# Patient Record
Sex: FEMALE | Race: WHITE
[De-identification: names, ages, dates, MRNs, and addresses within clinical notes are randomized per-mention and may not be internally consistent; named-entity substitution may affect disease eponyms.]

---

## 2017-04-05 ENCOUNTER — HOSPITAL ENCOUNTER (EMERGENCY)
Dept: HOSPITAL 83 - ED | Age: 27
Discharge: HOME | End: 2017-04-05
Payer: COMMERCIAL

## 2017-04-05 VITALS — HEIGHT: 55 IN | WEIGHT: 160 LBS
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
## 2017-04-05 DIAGNOSIS — M79.602: Primary | ICD-10-CM

## 2017-04-05 DIAGNOSIS — Z88.0: ICD-10-CM
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
## 2017-04-05 DIAGNOSIS — F17.200: ICD-10-CM

## 2017-04-05 DIAGNOSIS — Z88.1: ICD-10-CM
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
## 2017-04-23 ENCOUNTER — HOSPITAL ENCOUNTER (EMERGENCY)
Dept: HOSPITAL 83 - ED | Age: 27
Discharge: HOME | End: 2017-04-23
Payer: COMMERCIAL

## 2017-04-23 VITALS — BODY MASS INDEX: 27.23 KG/M2 | HEIGHT: 61.97 IN | WEIGHT: 148 LBS

## 2017-04-23 DIAGNOSIS — Z88.0: ICD-10-CM
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
## 2017-04-23 DIAGNOSIS — F17.200: ICD-10-CM

## 2017-04-23 DIAGNOSIS — S00.83XA: Primary | ICD-10-CM

## 2017-04-23 DIAGNOSIS — Z88.1: ICD-10-CM

## 2017-04-23 DIAGNOSIS — S60.221A: ICD-10-CM
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
## 2017-06-21 ENCOUNTER — HOSPITAL ENCOUNTER (EMERGENCY)
Dept: HOSPITAL 83 - ED | Age: 27
Discharge: HOME | End: 2017-06-21
Payer: COMMERCIAL

## 2017-06-21 VITALS — WEIGHT: 149 LBS | BODY MASS INDEX: 27.42 KG/M2 | HEIGHT: 61.97 IN

## 2017-06-21 DIAGNOSIS — T23.001A: Primary | ICD-10-CM

## 2017-06-21 DIAGNOSIS — Z88.0: ICD-10-CM
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
## 2017-06-21 DIAGNOSIS — Z88.1: ICD-10-CM
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
## 2017-06-21 DIAGNOSIS — F17.200: ICD-10-CM

## 2017-07-21 ENCOUNTER — HOSPITAL ENCOUNTER (EMERGENCY)
Dept: HOSPITAL 83 - ED | Age: 27
Discharge: HOME | End: 2017-07-21
Payer: COMMERCIAL

## 2017-07-21 VITALS — WEIGHT: 145 LBS | BODY MASS INDEX: 26.68 KG/M2 | HEIGHT: 61.97 IN

## 2017-07-21 DIAGNOSIS — Z88.0: ICD-10-CM

## 2017-07-21 DIAGNOSIS — K52.9: Primary | ICD-10-CM

## 2017-07-21 DIAGNOSIS — F17.200: ICD-10-CM

## 2017-07-21 DIAGNOSIS — Z88.1: ICD-10-CM

## 2017-07-21 LAB
ALBUMIN SERPL-MCNC: (no result) G/DL
ALBUMIN SERPL-MCNC: 4 GM/DL (ref 3.1–4.5)
ALP SERPL-CCNC: 47 U/L (ref 45–117)
ALT SERPL W P-5'-P-CCNC: 11 U/L (ref 12–78)
APPEARANCE UR: (no result)
AST SERPL-CCNC: 9 IU/L (ref 3–35)
B-HCG SERPL-ACNC: NEGATIVE
BACTERIA #/AREA URNS HPF: (no result) /[HPF]
BASOPHILS # BLD AUTO: 0 10*3/UL (ref 0–0.1)
BASOPHILS NFR BLD AUTO: 0.2 % (ref 0–1)
BILIRUB UR QL STRIP: NEGATIVE
BUN SERPL-MCNC: 9 MG/DL (ref 7–24)
CHLORIDE SERPL-SCNC: 110 MMOL/L (ref 98–107)
CO2 SERPL-SCNC: 21 MMOL/L (ref 21–32)
COLOR UR: YELLOW
CRP SERPL-MCNC: < 0.29 MG/DL (ref 0–0.3)
EOSINOPHIL # BLD AUTO: 0 10*3/UL (ref 0–0.4)
EOSINOPHIL # BLD AUTO: 0.3 % (ref 1–4)
EPI CELLS #/AREA URNS HPF: (no result) /[HPF]
ERYTHROCYTE [DISTWIDTH] IN BLOOD BY AUTOMATED COUNT: 12.8 % (ref 0–14.5)
GLUCOSE SERPL-MCNC: 92 MG/DL (ref 65–99)
GLUCOSE UR QL: NEGATIVE
HCT VFR BLD AUTO: 39 % (ref 37–47)
HGB BLD-MCNC: 13.5 G/DL (ref 12–16)
HGB UR QL STRIP: NEGATIVE
IG #: 0.1 10*3/UL (ref 0–0.1)
KETONES UR QL STRIP: (no result)
LEUKOCYTE ESTERASE UR QL STRIP: (no result)
LYMPHOCYTES # BLD AUTO: 2.4 10*3/UL (ref 1.3–4.4)
LYMPHOCYTES NFR BLD AUTO: 15.1 % (ref 27–41)
MCH RBC QN AUTO: 29.7 PG (ref 27–31)
MCHC RBC AUTO-ENTMCNC: 34.6 G/DL (ref 33–37)
MCV RBC AUTO: 85.7 FL (ref 81–99)
MONOCYTES # BLD AUTO: 0.8 10*3/UL (ref 0.1–1)
MONOCYTES NFR BLD MANUAL: 5 % (ref 3–9)
NEUT #: 12.5 10*3/UL (ref 2.3–7.9)
NEUT %: 79 % (ref 47–73)
NITRITE UR QL STRIP: NEGATIVE
NRBC BLD QL AUTO: 0 % (ref 0–0)
PH UR STRIP: >= 9 [PH] (ref 5–9)
PLATELET # BLD AUTO: 249 10*3/UL (ref 130–400)
PMV BLD AUTO: 10.2 FL (ref 9.6–12.3)
POTASSIUM SERPL-SCNC: 3.7 MMOL/L (ref 3.5–5.1)
PROT SERPL-MCNC: 7.2 GM/DL (ref 6.4–8.2)
RBC # BLD AUTO: 4.55 10*6/UL (ref 4.1–5.1)
RBC #/AREA URNS HPF: (no result) RBC/HPF (ref 0–2)
SODIUM SERPL-SCNC: 140 MMOL/L (ref 136–145)
SP GR UR: 1.01 (ref 1–1.03)
URINE REFLEX COMMENT: YES
UROBILINOGEN UR STRIP-MCNC: 1 E.U./DL (ref 0.2–1)
WBC NRBC COR # BLD AUTO: 15.8 10*3/UL (ref 4.8–10.8)

## 2018-03-07 ENCOUNTER — HOSPITAL ENCOUNTER (OUTPATIENT)
Dept: HOSPITAL 83 - LAB | Age: 28
Discharge: HOME | End: 2018-03-07
Attending: OBSTETRICS & GYNECOLOGY
Payer: COMMERCIAL

## 2018-03-07 DIAGNOSIS — Z3A.11: ICD-10-CM

## 2018-03-07 DIAGNOSIS — Z34.80: Primary | ICD-10-CM

## 2018-03-08 LAB — HEPATITIS C VIRUS ANTIBODY: <0.1 S/CO (ref 0–0.9)

## 2018-04-05 ENCOUNTER — ROUTINE PRENATAL (OUTPATIENT)
Dept: OBGYN CLINIC | Age: 28
End: 2018-04-05
Payer: MEDICAID

## 2018-04-05 VITALS
DIASTOLIC BLOOD PRESSURE: 77 MMHG | BODY MASS INDEX: 30 KG/M2 | SYSTOLIC BLOOD PRESSURE: 117 MMHG | WEIGHT: 164 LBS | HEART RATE: 96 BPM

## 2018-04-05 DIAGNOSIS — F12.20 CANNABIS DEPENDENCE (HCC): ICD-10-CM

## 2018-04-05 DIAGNOSIS — O09.892 CYSTIC FIBROSIS CARRIER IN SECOND TRIMESTER, ANTEPARTUM: Primary | ICD-10-CM

## 2018-04-05 DIAGNOSIS — Z03.75 SUSPECTED SHORTENING OF CERVIX NOT FOUND: ICD-10-CM

## 2018-04-05 DIAGNOSIS — Z14.1 CYSTIC FIBROSIS CARRIER IN SECOND TRIMESTER, ANTEPARTUM: Primary | ICD-10-CM

## 2018-04-05 DIAGNOSIS — O99.322 DRUG DEPENDENCE AFFECTING PREGNANCY IN SECOND TRIMESTER: ICD-10-CM

## 2018-04-05 DIAGNOSIS — O99.332 TOBACCO SMOKING AFFECTING PREGNANCY IN SECOND TRIMESTER: ICD-10-CM

## 2018-04-05 DIAGNOSIS — Z3A.15 15 WEEKS GESTATION OF PREGNANCY: ICD-10-CM

## 2018-04-05 LAB
GLUCOSE URINE, POC: NORMAL
PROTEIN UA: POSITIVE

## 2018-04-05 PROCEDURE — G8419 CALC BMI OUT NRM PARAM NOF/U: HCPCS | Performed by: OBSTETRICS & GYNECOLOGY

## 2018-04-05 PROCEDURE — 76815 OB US LIMITED FETUS(S): CPT | Performed by: OBSTETRICS & GYNECOLOGY

## 2018-04-05 PROCEDURE — 81002 URINALYSIS NONAUTO W/O SCOPE: CPT | Performed by: OBSTETRICS & GYNECOLOGY

## 2018-04-05 PROCEDURE — 76817 TRANSVAGINAL US OBSTETRIC: CPT | Performed by: OBSTETRICS & GYNECOLOGY

## 2018-04-05 PROCEDURE — 99201 HC NEW PT, E/M LEVEL 1: CPT

## 2018-04-05 PROCEDURE — 99243 OFF/OP CNSLTJ NEW/EST LOW 30: CPT | Performed by: OBSTETRICS & GYNECOLOGY

## 2018-04-05 PROCEDURE — G8427 DOCREV CUR MEDS BY ELIG CLIN: HCPCS | Performed by: OBSTETRICS & GYNECOLOGY

## 2018-05-03 ENCOUNTER — ROUTINE PRENATAL (OUTPATIENT)
Dept: OBGYN CLINIC | Age: 28
End: 2018-05-03
Payer: MEDICAID

## 2018-05-03 VITALS
SYSTOLIC BLOOD PRESSURE: 123 MMHG | HEART RATE: 96 BPM | DIASTOLIC BLOOD PRESSURE: 81 MMHG | WEIGHT: 170 LBS | BODY MASS INDEX: 31.09 KG/M2

## 2018-05-03 DIAGNOSIS — F12.20 CANNABIS DEPENDENCE (HCC): ICD-10-CM

## 2018-05-03 DIAGNOSIS — O99.332 TOBACCO SMOKING AFFECTING PREGNANCY IN SECOND TRIMESTER: ICD-10-CM

## 2018-05-03 DIAGNOSIS — Z3A.19 19 WEEKS GESTATION OF PREGNANCY: ICD-10-CM

## 2018-05-03 DIAGNOSIS — Z36.89 ENCOUNTER FOR FETAL ANATOMIC SURVEY: ICD-10-CM

## 2018-05-03 DIAGNOSIS — O09.892 CYSTIC FIBROSIS CARRIER IN SECOND TRIMESTER, ANTEPARTUM: ICD-10-CM

## 2018-05-03 DIAGNOSIS — Z14.1 CYSTIC FIBROSIS CARRIER IN SECOND TRIMESTER, ANTEPARTUM: ICD-10-CM

## 2018-05-03 DIAGNOSIS — O99.322 DRUG DEPENDENCE AFFECTING PREGNANCY IN SECOND TRIMESTER: Primary | ICD-10-CM

## 2018-05-03 PROCEDURE — 81002 URINALYSIS NONAUTO W/O SCOPE: CPT | Performed by: OBSTETRICS & GYNECOLOGY

## 2018-05-03 PROCEDURE — 4004F PT TOBACCO SCREEN RCVD TLK: CPT | Performed by: OBSTETRICS & GYNECOLOGY

## 2018-05-03 PROCEDURE — 76811 OB US DETAILED SNGL FETUS: CPT | Performed by: OBSTETRICS & GYNECOLOGY

## 2018-05-03 PROCEDURE — 99211 OFF/OP EST MAY X REQ PHY/QHP: CPT | Performed by: OBSTETRICS & GYNECOLOGY

## 2018-05-03 PROCEDURE — G8419 CALC BMI OUT NRM PARAM NOF/U: HCPCS | Performed by: OBSTETRICS & GYNECOLOGY

## 2018-05-03 PROCEDURE — 99214 OFFICE O/P EST MOD 30 MIN: CPT | Performed by: OBSTETRICS & GYNECOLOGY

## 2018-05-03 PROCEDURE — G8427 DOCREV CUR MEDS BY ELIG CLIN: HCPCS | Performed by: OBSTETRICS & GYNECOLOGY

## 2018-05-04 LAB
GLUCOSE URINE, POC: NEGATIVE
PROTEIN UA: NEGATIVE

## 2018-06-21 ENCOUNTER — HOSPITAL ENCOUNTER (EMERGENCY)
Dept: HOSPITAL 83 - ED | Age: 28
Discharge: HOME | End: 2018-06-21
Payer: COMMERCIAL

## 2018-06-21 VITALS — BODY MASS INDEX: 32.2 KG/M2 | WEIGHT: 175 LBS | HEIGHT: 61.97 IN

## 2018-06-21 DIAGNOSIS — Z88.0: ICD-10-CM

## 2018-06-21 DIAGNOSIS — Z3A.26: ICD-10-CM

## 2018-06-21 DIAGNOSIS — O26.892: Primary | ICD-10-CM

## 2018-06-21 DIAGNOSIS — K08.89: ICD-10-CM

## 2018-06-21 DIAGNOSIS — Z91.09: ICD-10-CM

## 2018-06-21 DIAGNOSIS — Z88.1: ICD-10-CM

## 2018-07-16 ENCOUNTER — HOSPITAL ENCOUNTER (OUTPATIENT)
Age: 28
Setting detail: OBSERVATION
Discharge: HOME | End: 2018-07-16
Attending: OBSTETRICS & GYNECOLOGY | Admitting: OBSTETRICS & GYNECOLOGY
Payer: MEDICAID

## 2018-07-16 VITALS
HEART RATE: 90 BPM | TEMPERATURE: 98.5 F | WEIGHT: 175 LBS | SYSTOLIC BLOOD PRESSURE: 115 MMHG | HEIGHT: 62 IN | RESPIRATION RATE: 14 BRPM | DIASTOLIC BLOOD PRESSURE: 55 MMHG | BODY MASS INDEX: 32.2 KG/M2

## 2018-07-16 PROBLEM — Z3A.29 29 WEEKS GESTATION OF PREGNANCY: Status: ACTIVE | Noted: 2018-07-16

## 2018-07-16 PROBLEM — Z3A.29 PREGNANCY WITH 29 COMPLETED WEEKS GESTATION: Status: ACTIVE | Noted: 2018-07-16

## 2018-07-16 LAB
ALBUMIN SERPL-MCNC: 3.2 G/DL (ref 3.5–5.2)
ALP BLD-CCNC: 77 U/L (ref 35–104)
ALT SERPL-CCNC: 6 U/L (ref 0–32)
ANION GAP SERPL CALCULATED.3IONS-SCNC: 17 MMOL/L (ref 7–16)
AST SERPL-CCNC: 12 U/L (ref 0–31)
BASOPHILS ABSOLUTE: 0.02 E9/L (ref 0–0.2)
BASOPHILS RELATIVE PERCENT: 0.1 % (ref 0–2)
BILIRUB SERPL-MCNC: 0.2 MG/DL (ref 0–1.2)
BUN BLDV-MCNC: 7 MG/DL (ref 6–20)
CALCIUM SERPL-MCNC: 8.8 MG/DL (ref 8.6–10.2)
CHLORIDE BLD-SCNC: 103 MMOL/L (ref 98–107)
CO2: 16 MMOL/L (ref 22–29)
CREAT SERPL-MCNC: 0.4 MG/DL (ref 0.5–1)
EOSINOPHILS ABSOLUTE: 0.06 E9/L (ref 0.05–0.5)
EOSINOPHILS RELATIVE PERCENT: 0.4 % (ref 0–6)
GFR AFRICAN AMERICAN: >60
GFR NON-AFRICAN AMERICAN: >60 ML/MIN/1.73
GLUCOSE BLD-MCNC: 81 MG/DL (ref 74–109)
HCT VFR BLD CALC: 32.6 % (ref 34–48)
HEMOGLOBIN: 11.1 G/DL (ref 11.5–15.5)
IMMATURE GRANULOCYTES #: 0.07 E9/L
IMMATURE GRANULOCYTES %: 0.5 % (ref 0–5)
LYMPHOCYTES ABSOLUTE: 2.99 E9/L (ref 1.5–4)
LYMPHOCYTES RELATIVE PERCENT: 19.6 % (ref 20–42)
MCH RBC QN AUTO: 29.6 PG (ref 26–35)
MCHC RBC AUTO-ENTMCNC: 34 % (ref 32–34.5)
MCV RBC AUTO: 86.9 FL (ref 80–99.9)
MONOCYTES ABSOLUTE: 0.99 E9/L (ref 0.1–0.95)
MONOCYTES RELATIVE PERCENT: 6.5 % (ref 2–12)
NEUTROPHILS ABSOLUTE: 11.16 E9/L (ref 1.8–7.3)
NEUTROPHILS RELATIVE PERCENT: 72.9 % (ref 43–80)
PDW BLD-RTO: 12.8 FL (ref 11.5–15)
PLATELET # BLD: 275 E9/L (ref 130–450)
PMV BLD AUTO: 10.5 FL (ref 7–12)
POTASSIUM SERPL-SCNC: 4.4 MMOL/L (ref 3.5–5)
RBC # BLD: 3.75 E12/L (ref 3.5–5.5)
SODIUM BLD-SCNC: 136 MMOL/L (ref 132–146)
TOTAL PROTEIN: 6.4 G/DL (ref 6.4–8.3)
URIC ACID, SERUM: 4.4 MG/DL (ref 2.4–5.7)
WBC # BLD: 15.3 E9/L (ref 4.5–11.5)

## 2018-07-16 PROCEDURE — 2580000003 HC RX 258: Performed by: OBSTETRICS & GYNECOLOGY

## 2018-07-16 PROCEDURE — G0378 HOSPITAL OBSERVATION PER HR: HCPCS

## 2018-07-16 PROCEDURE — 96360 HYDRATION IV INFUSION INIT: CPT

## 2018-07-16 PROCEDURE — 76805 OB US >/= 14 WKS SNGL FETUS: CPT | Performed by: OBSTETRICS & GYNECOLOGY

## 2018-07-16 PROCEDURE — 99213 OFFICE O/P EST LOW 20 MIN: CPT | Performed by: OBSTETRICS & GYNECOLOGY

## 2018-07-16 PROCEDURE — 36415 COLL VENOUS BLD VENIPUNCTURE: CPT

## 2018-07-16 PROCEDURE — 99211 OFF/OP EST MAY X REQ PHY/QHP: CPT

## 2018-07-16 PROCEDURE — 99242 OFF/OP CONSLTJ NEW/EST SF 20: CPT | Performed by: OBSTETRICS & GYNECOLOGY

## 2018-07-16 PROCEDURE — 76819 FETAL BIOPHYS PROFIL W/O NST: CPT

## 2018-07-16 PROCEDURE — 76817 TRANSVAGINAL US OBSTETRIC: CPT | Performed by: OBSTETRICS & GYNECOLOGY

## 2018-07-16 PROCEDURE — 76817 TRANSVAGINAL US OBSTETRIC: CPT

## 2018-07-16 PROCEDURE — 96361 HYDRATE IV INFUSION ADD-ON: CPT

## 2018-07-16 PROCEDURE — 84550 ASSAY OF BLOOD/URIC ACID: CPT

## 2018-07-16 PROCEDURE — 76805 OB US >/= 14 WKS SNGL FETUS: CPT

## 2018-07-16 PROCEDURE — 85025 COMPLETE CBC W/AUTO DIFF WBC: CPT

## 2018-07-16 PROCEDURE — 76819 FETAL BIOPHYS PROFIL W/O NST: CPT | Performed by: OBSTETRICS & GYNECOLOGY

## 2018-07-16 PROCEDURE — 80053 COMPREHEN METABOLIC PANEL: CPT

## 2018-07-16 PROCEDURE — 6370000000 HC RX 637 (ALT 250 FOR IP): Performed by: OBSTETRICS & GYNECOLOGY

## 2018-07-16 RX ORDER — SODIUM CHLORIDE, SODIUM LACTATE, POTASSIUM CHLORIDE, CALCIUM CHLORIDE 600; 310; 30; 20 MG/100ML; MG/100ML; MG/100ML; MG/100ML
INJECTION, SOLUTION INTRAVENOUS CONTINUOUS
Status: DISCONTINUED | OUTPATIENT
Start: 2018-07-16 | End: 2018-07-16 | Stop reason: HOSPADM

## 2018-07-16 RX ORDER — MAGNESIUM HYDROXIDE/ALUMINUM HYDROXICE/SIMETHICONE 120; 1200; 1200 MG/30ML; MG/30ML; MG/30ML
30 SUSPENSION ORAL EVERY 6 HOURS PRN
Status: DISCONTINUED | OUTPATIENT
Start: 2018-07-16 | End: 2018-07-16 | Stop reason: HOSPADM

## 2018-07-16 RX ADMIN — ALUMINUM HYDROXIDE, MAGNESIUM HYDROXIDE, AND SIMETHICONE 30 ML: 200; 200; 20 SUSPENSION ORAL at 17:10

## 2018-07-16 RX ADMIN — SODIUM CHLORIDE, POTASSIUM CHLORIDE, SODIUM LACTATE AND CALCIUM CHLORIDE: 600; 310; 30; 20 INJECTION, SOLUTION INTRAVENOUS at 16:30

## 2018-07-16 NOTE — PROGRESS NOTES
Pt given discharge instructions, pt verbalizes understanding and pt left ambulatory with boyfriend and children

## 2018-07-16 NOTE — DISCHARGE SUMMARY
Patient was admitted for upper abd pain and vaginal Spotting. She now feels better after IV Fluid and Mylanta. She was evaluated by MFM and US is ok. CNP and Uric acid: normal. WBC; 15.3.  N: 72.9    Plan : Home, Follow-up with her [address] [place] [address]  Keep appointment with Dr Theodore Griffith

## 2018-07-16 NOTE — H&P
Department of Obstetrics and Gynecology  Physician Assistant Obstetrics History and Physical      HISTORY OF PRESENT ILLNESS:      The patient is a 34 y.o.  6 parity 2 at 33 weeks' 5 days' gestation presents to the antepartal floor c/o abdominal pain for 2 days. Her pain is intermittent; and, when present, is sharp. Last time she had abdominal pain was on her way to hospital.  Currently, she denies any abdominal pain. She had an episode of vomiting this morning, none since. Denies nausea, diarrhea, fever, chills, or constipation. Also, reports intermittent spotting for approximately 10 days which she states \"only occurs when bending over too much\" or \"with wiping\" after urination. Last episode of spotting was yesterday. She denies any urinary symptoms and states she has been drinking plenty of fluids. 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    Estimated Due Date:  2018  Contractions: No  Leaking of fluid: No  Bleeding:  No  Perceived fetal movement: good      PAST OB HISTORY:  OB History    Para Term  AB Living   6 2 2   3 2   SAB TAB Ectopic Molar Multiple Live Births   3         2      # Outcome Date GA Lbr Heath/2nd Weight Sex Delivery Anes PTL Lv   6 Current            5 SAB 2016           4 SAB 2016           3 2015           2 Term 13 40w0d  7 lb 14 oz (3.572 kg) F Vag-Spont  N SHAYLA   1 Term 11 42w0d  8 lb 7 oz (3.827 kg) M Vag-Spont  N SHAYLA              Pre-eclampsia:  No      :  No      D & C:  No      Cerclage:  No      LEEP:  No      Myomectomy:  No       Labor: No    Past Medical History:    Diagnosis Date    Anemia     Gastritis     GERD (gastroesophageal reflux disease)         Past Surgical History:    History reviewed. No pertinent surgical history. Social History:    Reports that she has been smoking.   She has been smoking about 0.25 packs per day. She has never used smokeless tobacco. She reports that she uses drugs, including Marijuana. She reports that she does not drink alcohol. Medications Prior to Admission:  Prescriptions Prior to Admission: Prenatal Vit-Fe Fumarate-FA (PRENATAL VITAMIN PO), Take by mouth    Allergies:  Penicillins; Tape Cynthea Belem tape]; and Azithromycin    Review of Systems:   Ears, nose, mouth, throat, and face: negative  Respiratory: negative  Cardiovascular: negative  Gastrointestinal: (+) abdominal pain, vomiting; otherwise, (-) nausea  Genitourinary:negative  Integument/breast: negative  Hematologic/lymphatic: negative  Musculoskeletal:negative  Neurological: negative  Behavioral/Psych: negative  Endocrine: negative  Allergic/Immunologic: negative  Pertinent (+) & (-)s addressed in HPI    PHYSICAL EXAM:  Temp 98.5 °F (36.9 °C) (Oral)   Resp 14   Ht 5' 2\" (1.575 m)   Wt 175 lb (79.4 kg)   BMI 32.01 kg/m²     General appearance: Comfortable  Lungs:  CTA bilaterally, good excursion  Heart:  Regular Rate & Rhythm, no murmur noted  Abdomen:  Soft, non-tender, gravid  Uterus: soft, nontender  Fetal heart rate:  Baseline Heart Rate 130 with moderate variability and accelerations that meet criteria for gestational age  Cervix: SSE: no blood or fluid in vagina. No blood on exam glove. DILATION: Closed  EFFACEMENT:   thick  Contraction frequency:  None  Membranes:  Intact  Extremities: (-) edema       ASSESSMENT: Patient was seen and examined by myself as well as by Dr. Alexus Ricketts. 33 yo  A3 IUP at 29 weeks' 5 days' gestation    Abdominal pain, improved  ?  Spotting    No blood         Plan: Orders per Dr. Alexus Ricketts:  EFM  Outpatient  CBC  CMP  Uric acid   U/a  IVF with [facility]  MFM consult      Electronically signed by Janiya Terry PA-C on 2018 at 2:47 PM

## 2018-07-16 NOTE — CONSULTS
edema, no calf muscle tenderness. Skin:     No rashes, no lesions. Patient Vitals for the past 24 hrs:   Temp Temp src Resp Height Weight   07/16/18 1433 98.5 °F (36.9 °C) Oral 14 5' 2\" (1.575 m) 175 lb (79.4 kg)       Body mass index is 32.01 kg/m². An ultrasound evaluation was done in our office today. Please refer to the enclosed copy of the ultrasound report for further information. Review of the fetal heart rate tracing showed a reactive and reassuring pattern    She said that she passed the glucose tolerance test that was done at the office of Dr. Pam Munguia. Result of test not available for review. IMPRESSION:  1. A 29w5d intrauterine pregnancy. 2. Obesity. 3. Cystic Fibrosis mutation carrier. 4. Father of the baby (Mr. Fernandez Kaminski) declined genetic testing for cystic fibrosis carrier status. 5. Patient and father of baby would not consider termination of pregnancy if her baby have cystic fibrosis. 6. Cigarette smoking during pregnancy. 7. Maternal obesity. 8. Marijuana abuse during pregnancy. RECOMMENDATIONS/PLAN:  I discussed with the patient the following points:    1. The benefits and limitations of ultrasound in prenatal diagnosis. Some defects might not always be seen by ultrasound. 2. No structural  anomalies are noted. Only genetic amniocentesis can rule out fetal chromosome anomalies. Normal ultrasound does not. 3. The size of her baby is appropriate for gestational age. 4. She is a carrier of the CF trait, and the father of her baby declined the CF screening test. On her last evaluation in our office she said that she would not  consider termination of pregnancy if the baby have cystic fibrosis and would refuse genetic amniocentesis if indicated to rule out the condition in her baby. 5. Obesity is assosiated with an increased risk of developing gestational diabetes, and disturbance in the growth of her baby, such as Large for dates and small for Dates.   She said that gestational diabetes was ruled out with a negative GTT done in the office of Dr Aurelia Suarez. Result of test not available for review. 6. The ill effects of cigarette smoking and substance abuse during pregnancy and its association with an increased risk of intrauterine growth restriction, placental abruption, and pregnancy loss. In addition to the increased risk of  morbidity and mortality there is an increased risk of sudden infant death syndrome in the households where people smoke. I recommend that she stops smoking, and abstains from illicit drug use. 7.  Some (not all) babies with the cystic fibrosis disease might have echogenic bowels on ultrasound. 8. The cervical length today is reassuring against the risk of a  delivery. 9. Fetal well-being was confirmed today. The amount of fluid around baby is normal.  The Biophysical profile score of 8/8 is reassuring, and the umbilical artery Doppler studies are normal.  10. She can be discharged home with following instructions. · She should monitor fetal well-being at home by counting movements after dinner. Her baby should  move 10 times in 2 hours; otherwise, she should call your office immediately. She is also to call, if she develops any headaches, blurred vision, abdominal pain, bleeding, or spotting, which are signs of preeclampsia. · She is to continue to follow follow-up in the office of Dr. Aurelia Suarez while arranging for transfer of prenatal care to a physician in the [facility] [place] area. She is planning on delivering at [facility] in [facility]. · I recommend a follow up ultrasound evaluation in  6 weeks, to check on fetal well being anatomy and growth    Thank you again, doctor, for allowing us to be of service to your patient. If I can be of further assistance, please do not hesitate to call.                              Sincerely,      Kelvin Dominguez M.D., [address] [facility]    Current encounter billing:  WILLIE INITL INPATIENT CONSULT NEW/ESTAB PT 40 MIN 33195 CPT®   US OB 14 Plus Weeks Single or First Gestation [id number] Custom]  US Fetal Biophysical Profile WO Non Stress Testing [10960 Custom]  US OB Transvaginal [id number] Custom]      **This report has been created using voice recognition software.  It may contain minor errors     which are inherent in voice recognition technology**

## 2018-07-30 ENCOUNTER — ROUTINE PRENATAL (OUTPATIENT)
Dept: OBGYN CLINIC | Age: 28
End: 2018-07-30
Payer: MEDICAID

## 2018-07-30 VITALS
BODY MASS INDEX: 33.65 KG/M2 | DIASTOLIC BLOOD PRESSURE: 73 MMHG | SYSTOLIC BLOOD PRESSURE: 106 MMHG | HEART RATE: 99 BPM | WEIGHT: 184 LBS

## 2018-07-30 DIAGNOSIS — O99.323 DRUG DEPENDENCE AFFECTING PREGNANCY IN THIRD TRIMESTER: ICD-10-CM

## 2018-07-30 DIAGNOSIS — O09.893 CYSTIC FIBROSIS CARRIER IN THIRD TRIMESTER, ANTEPARTUM: ICD-10-CM

## 2018-07-30 DIAGNOSIS — O99.333 TOBACCO SMOKING AFFECTING PREGNANCY IN THIRD TRIMESTER: ICD-10-CM

## 2018-07-30 DIAGNOSIS — Z3A.31 31 WEEKS GESTATION OF PREGNANCY: ICD-10-CM

## 2018-07-30 DIAGNOSIS — F12.20 CANNABIS DEPENDENCE (HCC): ICD-10-CM

## 2018-07-30 DIAGNOSIS — Z14.1 CYSTIC FIBROSIS CARRIER IN THIRD TRIMESTER, ANTEPARTUM: ICD-10-CM

## 2018-07-30 DIAGNOSIS — O99.213 OBESITY AFFECTING PREGNANCY IN THIRD TRIMESTER: Primary | ICD-10-CM

## 2018-07-30 LAB
GLUCOSE URINE, POC: NEGATIVE
PROTEIN UA: POSITIVE

## 2018-07-30 PROCEDURE — 99211 OFF/OP EST MAY X REQ PHY/QHP: CPT | Performed by: OBSTETRICS & GYNECOLOGY

## 2018-07-30 PROCEDURE — 76819 FETAL BIOPHYS PROFIL W/O NST: CPT | Performed by: OBSTETRICS & GYNECOLOGY

## 2018-07-30 PROCEDURE — 81002 URINALYSIS NONAUTO W/O SCOPE: CPT | Performed by: OBSTETRICS & GYNECOLOGY

## 2018-07-30 PROCEDURE — 76815 OB US LIMITED FETUS(S): CPT | Performed by: OBSTETRICS & GYNECOLOGY

## 2018-07-30 PROCEDURE — G8417 CALC BMI ABV UP PARAM F/U: HCPCS | Performed by: OBSTETRICS & GYNECOLOGY

## 2018-07-30 PROCEDURE — 99213 OFFICE O/P EST LOW 20 MIN: CPT | Performed by: OBSTETRICS & GYNECOLOGY

## 2018-07-30 PROCEDURE — G8427 DOCREV CUR MEDS BY ELIG CLIN: HCPCS | Performed by: OBSTETRICS & GYNECOLOGY

## 2018-07-30 PROCEDURE — 4004F PT TOBACCO SCREEN RCVD TLK: CPT | Performed by: OBSTETRICS & GYNECOLOGY

## 2018-07-30 NOTE — PATIENT INSTRUCTIONS
Call your primary obstetrician with bleeding, leaking of fluid, abdominal tenderness, headache, blurry vision, epigastric pain and increased urinary frequency. Any questions contact Solomon at [phone]. If you are experiencing an emergency and need immediate help, call 911 or go to go emergency room or labor and delivery. Do kick counts after dinner. Call your primary obstetrician if less than 10 kicks in 2 hours after dinner. Call your primary obstetrician with bleeding, leaking of fluid, abdominal tenderness, headache, blurry vision, epigastric pain and increased urinary frequency. You might be having an NST at your next appt. Please eat a large snack or breakfast before coming to office. Thank you  Patient Education        Weeks 30 to 28 of Your Pregnancy: Care Instructions  Your Care Instructions    You have made it to the final months of your pregnancy. By now, your baby is really starting to look like a baby, with hair and plump skin. As you enter the final weeks of pregnancy, the reality of having a baby may start to set in. This is the time to settle on a name, get your household in order, set up a safe nursery, and find quality  if needed. Doing these things in advance will allow you to focus on caring for and enjoying your new baby. You may also want to have a tour of your hospital's labor and delivery unit to get a better idea of what to expect while you are in the hospital.  During these last months, it is very important to take good care of yourself and pay attention to what your body needs. If your doctor says it is okay for you to work, don't push yourself too hard. Use the tips provided in this care sheet to ease heartburn and care for varicose veins. If you haven't already had the Tdap shot during this pregnancy, talk to your doctor about getting it. It will help protect your  against pertussis infection. Follow-up care is a key part of your treatment and safety.  Be sure to make and go to all appointments, and call your doctor if you are having problems. It's also a good idea to know your test results and keep a list of the medicines you take. How can you care for yourself at home? Pay attention to your baby's movements  · You should feel your baby move several times every day. · Your baby now turns less, and kicks and jabs more. · Your baby sleeps 20 to 45 minutes at a time and is more active at certain times of day. · If your doctor wants you to count your baby's kicks:  ¨ Empty your bladder, and lie on your side or relax in a comfortable chair. ¨ Write down your start time. ¨ Pay attention only to your baby's movements. Count any movement except hiccups. ¨ After you have counted 10 movements, write down your stop time. ¨ Write down how many minutes it took for your baby to move 10 times. ¨ If an hour goes by and you have not recorded 10 movements, have something to eat or drink and then count for another hour. If you do not record 10 movements in either hour, call your doctor. Ease heartburn  · Eat small, frequent meals. · Do not eat chocolate, peppermint, or very spicy foods. Avoid drinks with caffeine, such as coffee, tea, and sodas. · Avoid bending over or lying down after meals. · Talk a short walk after you eat. · If heartburn is a problem at night, do not eat for 2 hours before bedtime. · Take antacids like Mylanta, Maalox, Rolaids, or Tums. Do not take antacids that have sodium bicarbonate. Care for varicose veins  · Varicose veins are blood vessels that stretch out with the extra blood during pregnancy. Your legs may ache or throb. Most varicose veins will go away after the birth. · Avoid standing for long periods of time. Sit with your legs crossed at the ankles, not the knees. · Sit with your feet propped up. · Avoid tight clothing or stockings. Wear support hose. · Exercise regularly. Try walking for at least 30 minutes a day.   Where can you learn more?  Go to [email or web address] and sign in to your [employer] account. Enter O515 in the [facility] box to learn more about \"Weeks 30 to 32 of Your Pregnancy: Care Instructions. \"     If you do not have an account, please click on the \"Sign Up Now\" link. Current as of: November 21, 2017  Content Version: 11.6  © 4091-3361 [employer]. Care instructions adapted under license by [facility] [facility] ([facility]). If you have questions about a medical condition or this instruction, always ask your healthcare professional. [place] [address] any warranty or liability for your use of this information. Patient Education        Learning About When to Call Your Doctor During Pregnancy (After 20 Weeks)  Your Care Instructions  It's common to have concerns about what might be a problem during pregnancy. Although most pregnant women don't have any serious problems, it's important to know when to call your doctor if you have certain symptoms or signs of labor. These are general suggestions. Your doctor may give you some more information about when to call. When to call your doctor (after 20 weeks)  Call 911 anytime you think you may need emergency care. For example, call if:  · You have severe vaginal bleeding. · You have sudden, severe pain in your belly. · You passed out (lost consciousness). · You have a seizure. · You see or feel the umbilical cord. · You think you are about to deliver your baby and can't make it safely to the hospital.  Call your doctor now or seek immediate medical care if:  · You have vaginal bleeding. · You have belly pain. · You have a fever. · You have symptoms of preeclampsia, such as:  ¨ Sudden swelling of your face, hands, or feet. ¨ New vision problems (such as dimness or blurring). ¨ A severe headache. · You have a sudden release of fluid from your vagina. (You think your water broke.)  · You think that you may be in labor.  This means

## 2018-07-30 NOTE — PROGRESS NOTES
18     RE:  Crispin Dumont   : 1990   AGE: 34 y.o. Rufina Pavon MD  [facility]. [address] [facility]. Fara Brambila    Dear Dr Orozco Odor:    Mrs. Crispin benson 34 y.o.  [id number]  is seen today on follow up in our office. REASON FOR APPOINTMENT:  1. Follow-up on recent hospital evaluation in the labor unit two weeks ago with sharp abdominal pain. 2. Obesity. 3. Cystic Fibrosis mutation carrier. 4. Cigarette smoking during pregnancy. 5. Maternal obesity. 6. Marijuana abuse during pregnancy. MEDICATIONS:    Prenatal Vitamins    INTERVAL HISTORY:  Mrs Cripsin Dumont had an uneventful course of pregnancy since her home discharge. She said that she is still smoking five cigarettes per day. Last time she used marijuana was three weeks ago. When seen today in our office she had no complaints. PHYSICAL EXAMINATION:  General Appearance:  Healthy looking, alert, no acute distress. Eyes:     No pallor, no icterus, no photophobia. Ears:     No ear drainage. Nose:     No nasal drainage, no paranasal sinus tenderness. Throat:   Mucosa moist, no oral thrush, no exudate. Neck:     No nuchal rigidity. Back:     No CVA tenderness. Abdomen:    Soft nontender. Extremities:    No pretibial pitting edema, no calf muscle tenderness. Skin:     No rashes, no lesions. BP: 106/73 Weight: 184 lb (83.5 kg)   Pulse: 99     Body mass index is 33.65 kg/m². Urine dipstick:  Glucose : Negative   Albumin:  Trace       IMPRESSION:  1. A  31w5d  intrauterine gestation. 2. Obesity. 3. Cystic Fibrosis mutation carrier. 4. Father of the baby (Mr. Fernandez Kaminski) declined genetic testing for cystic fibrosis carrier status. 5. Patient and father of baby would not consider termination of pregnancy if her baby have cystic fibrosis. 6. Cigarette smoking during pregnancy. 7. Maternal obesity.   8. Marijuana abuse during pregnancy.     RECOMMENDATIONS/PLAN:  I discussed

## 2018-07-30 NOTE — LETTER
18     RE:  Carliss Nissen   : 1990   AGE: 34 y.o. Hernandez Magdaleno MD  [facility]. [address] [facility]. [place]    Dear Dr Modesto Olmedo:    Mrs. Carliss Nissen a 34 y.o.  [id number]  is seen today on follow up in our office. REASON FOR APPOINTMENT:  1. Follow-up on recent hospital evaluation in the labor unit two weeks ago with sharp abdominal pain. 2. Obesity. 3. Cystic Fibrosis mutation carrier. 4. Cigarette smoking during pregnancy. 5. Maternal obesity. 6. Marijuana abuse during pregnancy. MEDICATIONS:    Prenatal Vitamins    INTERVAL HISTORY:  Mrs Carliss Nissen had an uneventful course of pregnancy since her home discharge. She said that she is still smoking five cigarettes per day. Last time she used marijuana was three weeks ago. When seen today in our office she had no complaints. PHYSICAL EXAMINATION:  General Appearance:  Healthy looking, alert, no acute distress. Eyes:     No pallor, no icterus, no photophobia. Ears:     No ear drainage. Nose:     No nasal drainage, no paranasal sinus tenderness. Throat:   Mucosa moist, no oral thrush, no exudate. Neck:     No nuchal rigidity. Back:     No CVA tenderness. Abdomen:    Soft nontender. Extremities:    No pretibial pitting edema, no calf muscle tenderness. Skin:     No rashes, no lesions. BP: 106/73 Weight: 184 lb (83.5 kg)   Pulse: 99     Body mass index is 33.65 kg/m². Urine dipstick:  Glucose : Negative   Albumin:  Trace             RE:  Carliss Nissen                      Page: 2  18    IMPRESSION:  1. A  31w5d  intrauterine gestation. 2. Obesity. 3. Cystic Fibrosis mutation carrier. 4. Father of the baby (Mr. Darrel Delgado) declined genetic testing for cystic fibrosis carrier status. 5. Patient and father of baby would not consider termination of pregnancy if her baby have cystic fibrosis. 6. Cigarette smoking during pregnancy. 7. Maternal obesity. 8. Marijuana abuse during pregnancy.     RECOMMENDATIONS/PLAN:  I discussed with the patient the following points:     1. The size of her baby is appropriate for gestational age. 2. She is a carrier of the CF trait, and the father of her baby declined the CF screening test. On her last evaluation in our office she said that she would not  consider termination of pregnancy if the baby have cystic fibrosis and would refuse genetic amniocentesis if indicated to rule out the condition in her baby. 3. The ill effects of cigarette smoking and substance abuse during pregnancy and its association with an increased risk of intrauterine growth restriction, placental abruption, and pregnancy loss. In addition to the increased risk of  morbidity and mortality there is an increased risk of sudden infant death syndrome in the households where people smoke. I recommend that she stops smoking, and abstains from illicit drug use. 4.  Some (not all) babies with the cystic fibrosis disease might have echogenic bowels on ultrasound. 5. Fetal well-being was confirmed today. The amount of fluid around baby is normal.  The Biophysical profile score of 8/8 is reassuring, and the umbilical artery Doppler studies are normal.  6. She should monitor fetal well-being at home by counting movements after dinner. Her baby should  move 10 times in 2 hours; otherwise, she should call your office immediately. She is also to call, if she develops any headaches, blurred vision, abdominal pain, bleeding, or spotting, which are signs of preeclampsia. 7. She is to continue to follow follow-up in the office of Dr. Hadley Ott while arranging for transfer of prenatal care to a physician in the [facility] [place] area. She is planning on delivering at [facility] in [facility].   8. I recommend a follow up ultrasound evaluation in 4 weeks, to check on fetal well being anatomy and growth

## 2018-08-08 ENCOUNTER — HOSPITAL ENCOUNTER (EMERGENCY)
Dept: HOSPITAL 83 - ED | Age: 28
Discharge: HOME | End: 2018-08-08
Payer: COMMERCIAL

## 2018-08-08 VITALS — HEIGHT: 61.97 IN | WEIGHT: 183 LBS | BODY MASS INDEX: 33.68 KG/M2

## 2018-08-08 DIAGNOSIS — Z88.0: ICD-10-CM

## 2018-08-08 DIAGNOSIS — O26.893: Primary | ICD-10-CM

## 2018-08-08 DIAGNOSIS — R51: ICD-10-CM

## 2018-08-08 DIAGNOSIS — Z3A.33: ICD-10-CM

## 2018-08-08 DIAGNOSIS — O99.333: ICD-10-CM

## 2018-08-08 DIAGNOSIS — Z88.1: ICD-10-CM

## 2018-08-08 DIAGNOSIS — R11.0: ICD-10-CM

## 2018-08-08 DIAGNOSIS — F17.200: ICD-10-CM

## 2018-08-08 LAB
APPEARANCE UR: (no result)
BACTERIA #/AREA URNS HPF: (no result) /[HPF]
BILIRUB UR QL STRIP: NEGATIVE
COLOR UR: YELLOW
GLUCOSE UR QL: NEGATIVE
HGB UR QL STRIP: (no result)
KETONES UR QL STRIP: (no result)
LEUKOCYTE ESTERASE UR QL STRIP: NEGATIVE
MUCOUS THREADS URNS QL MICRO: (no result)
NITRITE UR QL STRIP: NEGATIVE
PH UR STRIP: 6 [PH] (ref 5–9)
RBC #/AREA URNS HPF: (no result) RBC/HPF (ref 0–2)
SP GR UR: 1.02 (ref 1–1.03)
UROBILINOGEN UR STRIP-MCNC: 0.2 E.U./DL (ref 0.2–1)
WBC #/AREA URNS HPF: (no result) WBC/HPF (ref 0–5)

## 2018-08-30 ENCOUNTER — ROUTINE PRENATAL (OUTPATIENT)
Dept: OBGYN CLINIC | Age: 28
End: 2018-08-30
Payer: MEDICAID

## 2018-08-30 VITALS
BODY MASS INDEX: 34.39 KG/M2 | SYSTOLIC BLOOD PRESSURE: 122 MMHG | WEIGHT: 188 LBS | HEART RATE: 87 BPM | DIASTOLIC BLOOD PRESSURE: 80 MMHG

## 2018-08-30 DIAGNOSIS — F12.10 CANNABIS ABUSE: ICD-10-CM

## 2018-08-30 DIAGNOSIS — Z3A.36 36 WEEKS GESTATION OF PREGNANCY: ICD-10-CM

## 2018-08-30 DIAGNOSIS — O40.3XX0 POLYHYDRAMNIOS, THIRD TRIMESTER, NOT APPLICABLE OR UNSPECIFIED: Primary | ICD-10-CM

## 2018-08-30 DIAGNOSIS — O99.323 DRUG DEPENDENCE AFFECTING PREGNANCY IN THIRD TRIMESTER: ICD-10-CM

## 2018-08-30 DIAGNOSIS — O99.333 TOBACCO SMOKING AFFECTING PREGNANCY IN THIRD TRIMESTER: ICD-10-CM

## 2018-08-30 DIAGNOSIS — O99.213 OBESITY AFFECTING PREGNANCY IN THIRD TRIMESTER: ICD-10-CM

## 2018-08-30 DIAGNOSIS — O09.33 LIMITED PRENATAL CARE, THIRD TRIMESTER: ICD-10-CM

## 2018-08-30 DIAGNOSIS — O36.63X0 EXCESSIVE FETAL GROWTH AFFECTING MANAGEMENT OF MOTHER IN SINGLETON PREGNANCY IN THIRD TRIMESTER, ANTEPARTUM: ICD-10-CM

## 2018-08-30 LAB
GLUCOSE URINE, POC: NORMAL
PROTEIN UA: POSITIVE

## 2018-08-30 PROCEDURE — 81002 URINALYSIS NONAUTO W/O SCOPE: CPT | Performed by: OBSTETRICS & GYNECOLOGY

## 2018-08-30 PROCEDURE — 76818 FETAL BIOPHYS PROFILE W/NST: CPT | Performed by: OBSTETRICS & GYNECOLOGY

## 2018-08-30 PROCEDURE — 76816 OB US FOLLOW-UP PER FETUS: CPT | Performed by: OBSTETRICS & GYNECOLOGY

## 2018-08-30 PROCEDURE — 99211 OFF/OP EST MAY X REQ PHY/QHP: CPT | Performed by: OBSTETRICS & GYNECOLOGY

## 2018-08-30 PROCEDURE — G8417 CALC BMI ABV UP PARAM F/U: HCPCS | Performed by: OBSTETRICS & GYNECOLOGY

## 2018-08-30 PROCEDURE — 99214 OFFICE O/P EST MOD 30 MIN: CPT | Performed by: OBSTETRICS & GYNECOLOGY

## 2018-08-30 PROCEDURE — 76820 UMBILICAL ARTERY ECHO: CPT | Performed by: OBSTETRICS & GYNECOLOGY

## 2018-08-30 PROCEDURE — G8427 DOCREV CUR MEDS BY ELIG CLIN: HCPCS | Performed by: OBSTETRICS & GYNECOLOGY

## 2018-08-30 PROCEDURE — 4004F PT TOBACCO SCREEN RCVD TLK: CPT | Performed by: OBSTETRICS & GYNECOLOGY

## 2018-08-30 RX ORDER — RANITIDINE 150 MG/1
150 TABLET ORAL 2 TIMES DAILY
COMMUNITY

## 2018-08-30 NOTE — PROGRESS NOTES
18     RE:  Jean Mosqueda   : 1990   AGE: 34 y.o. Elsa Carroll MD  [facility]. [address] [facility] [place]    Dear Dr Bee Spotted:  Mrs. Jean Mosqueda a 34 y.o.  [id number]  is seen today on follow up in our office. REASON FOR APPOINTMENT:  1. To check on fetal well being anatomy and growth. 2. Obesity. 3. Cystic Fibrosis mutation carrier. 4. Cigarette smoking during pregnancy. 5. Maternal obesity. 6. Marijuana abuse during pregnancy. MEDICATIONS:    Prenatal Vitamins    INTERVAL HISTORY:  Mrs Jean Mosqueda had an uneventful course of pregnancy since her last visit to our office. She said that she is still smoking 5 cigarettes per day. Last time she used marijuana was 2 months ago. When seen today in our office she had no complaints. She was last seen in your office for prenatal care more than two months ago. Not have her sugar test yet. She did not schedule an appointment with an obstetrician in the [facility] area (was supposed to be seen by Dr. Shonna Harris), for prenatal care and delivery in [facility]. PHYSICAL EXAMINATION:  General Appearance:  Healthy looking, alert, no acute distress. Eyes:     No pallor, no icterus, no photophobia. Ears:     No ear drainage. Nose:     No nasal drainage, no paranasal sinus tenderness. Throat:   Mucosa moist, no oral thrush, no exudate. Neck:     No nuchal rigidity. Back:     No CVA tenderness. Abdomen:    Soft nontender. Extremities:    No pretibial pitting edema, no calf muscle tenderness. Skin:     No rashes, no lesions. BP: 122/80 Weight: 188 lb (85.3 kg)   Pulse: 87     Body mass index is 34.39 kg/m². Urine dipstick:  Glucose : Negative   Albumin:  Trace       An ultrasound evaluation was done in our office today. Please refer to the enclosed copy of the ultrasound report for further information. IMPRESSION:  1. A  36w1d  intrauterine gestation. I recommend that she stops smoking, and abstains from illicit drug use. 7. Fetal well-being was confirmed today. The amount of fluid around baby is normal.  The Biophysical profile score of 10/10 is reassuring, and the umbilical artery Doppler studies are normal.  8. She should monitor fetal well-being at home by counting movements after dinner. Her baby should  move 10 times in 2 hours; otherwise, she should call your office immediately. She is also to call, if she develops any headaches, blurred vision, abdominal pain, bleeding, or spotting, which are signs of preeclampsia. 9. She is to have her glucose tolerance test tomorrow to rule out gestational diabetes. 10. She is to be monitored with nonstress tests every 3-4 days for remainder of pregnancy and with biophysical profiles and umbilical artery Dopplers once a week. 11. She is to call the office of Dr. Roxy Vera and schedule an appointment for prenatal care as soon as possible.     Thank you again, doctor, for allowing us to be of service to your patient. If I can be of further assistance, please do not hesitate to call.                                                                                                                                 Sincerely,        Sangeeta Beck M.D., [address] [facility]     Current encounter billing:  AR OFFICE OUTPATIENT VISIT 25 MINUTES [[id number]]  US OB Follow Up Transabdominal Approach [HLC372 Custom]  Biophysical profile [OBO12 Custom]  US Doppler Fetal Umbilical Artery [OQS0172 Custom]    **This report has been created using voice recognition software.  It may contain minor errors     which are inherent in voice recognition technology**

## 2018-08-30 NOTE — LETTER
NON STRESS TEST INTERPRETATION    18    RE:  [place]   : 1990   AGE: 34 y.o. GESTATIONAL AGE:  36w1d    DIAGNOSIS:   Polyhydramnios     Obesity. Excessive fetal growth. Cigarette smoking     Marijuana abuse     Poor compliance and limited prenatal care      INDICATION:  Polyhydramnios.     TIME ON:  2:37 PM      TIME OFF:  3:07 PM      RESULT:   REACTIVE      FHR Baseline Rate:   120 bpm    PERIODIC CHANGES:    · Accelerations present, variability moderate, no decelerations noted    COMMENTS:      She is to monitored with NST's every 3-4 days, and BPP with umbilical artery doppler studies once per week        Peyton Gallegos MD
IMPRESSION:  1. A  36w1d  intrauterine gestation. 2. Polyhydramnios. 3. Excessive fetal growth. 4. Limited prenatal care (last seen in your office over two months ago. 5. Did not schedule appointment with obstetrician in the [facility] area yet. 6. Do not have her sugar test yet. 7. Obesity. 8. Cystic Fibrosis mutation carrier. 5. Father of the baby (Mr. Kriss Woo) declined genetic testing for cystic fibrosis carrier status. 10. Cigarette smoking during pregnancy. 11. Maternal obesity. 12. Marijuana abuse during pregnancy.     RECOMMENDATIONS/PLAN:  I discussed with the patient the following points:     1. The benefits and limitations of ultrasound in prenatal diagnosis. Some defects might not always be seen by ultrasound. Estimated incidence of these defects in the general population is 2- 4%. 2. No structural  anomalies are noted. Only genetic amniocentesis can rule out fetal chromosome anomalies. Normal ultrasound does not. 3. The size of her baby is above the 90th percentile for gestational age. 4. Hydramnios is noted today, she is obese and her baby is large. She is very likely to have gestational diabetes. I explained to her and her  that poorly controlled diabetes is associated with an increased risk of intrauterine fetal demise and  complications such as delayed maturation of the lungs, electrolyte imbalance, and jaundice, and need for operative delivery such as . 5. She is a carrier of the CF trait, and the father of her baby declined the CF screening test. Some (not all) babies with the cystic fibrosis disease might have echogenic bowels on ultrasound. 6. The ill effects of cigarette smoking and substance abuse during pregnancy and its association with an increased risk of intrauterine growth restriction, placental abruption, and pregnancy loss.  In addition to the increased risk of  morbidity and mortality there is an

## 2018-08-30 NOTE — PATIENT INSTRUCTIONS
opened and you are ready to push. ¨ Contractions are very strong to push the baby down the birth canal.  ¨ You will feel the urge to push. You may feel like you need to have a bowel movement. ¨ You may be coached to push with contractions. These contractions will be very strong, but you will not have them as often. You can get a little rest between contractions. ¨ You may be emotional and irritable. You may not be aware of what is going on around you. ¨ One last push, and your baby is born. · The third stage is when a few more contractions push out the placenta. This may take 30 minutes or less. · The fourth stage is the welcome recovery. You may feel overwhelmed with emotions and exhausted but alert. This is a good time to start breastfeeding. Where can you learn more? Go to https://GoSavemaverick.Zeis Excelsa. org and sign in to your CNG-One account. Enter X685 in the [employer] box to learn more about \"Weeks 34 to 36 of Your Pregnancy: Care Instructions. \"     If you do not have an account, please click on the \"Sign Up Now\" link. Current as of: November 21, 2017  Content Version: 11.7  © 8817-8066 [employer]. Care instructions adapted under license by [facility] ([facility]). If you have questions about a medical condition or this instruction, always ask your healthcare professional. [place] [address] any warranty or liability for your use of this information. Patient Education        Learning About When to Call Your Doctor During Pregnancy (After 20 Weeks)  Your Care Instructions  It's common to have concerns about what might be a problem during pregnancy. Although most pregnant women don't have any serious problems, it's important to know when to call your doctor if you have certain symptoms or signs of labor. These are general suggestions. Your doctor may give you some more information about when to call.   When to call your doctor (after 20 weeks)  Call 727 anytime you think you may need emergency care. For example, call if:  · You have severe vaginal bleeding. · You have sudden, severe pain in your belly. · You passed out (lost consciousness). · You have a seizure. · You see or feel the umbilical cord. · You think you are about to deliver your baby and can't make it safely to the hospital.  Call your doctor now or seek immediate medical care if:  · You have vaginal bleeding. · You have belly pain. · You have a fever. · You have symptoms of preeclampsia, such as:  ¨ Sudden swelling of your face, hands, or feet. ¨ New vision problems (such as dimness or blurring). ¨ A severe headache. · You have a sudden release of fluid from your vagina. (You think your water broke.)  · You think that you may be in labor. This means that you've had at least 4 contractions within 20 minutes or at least 8 contractions in an hour. · You notice that your baby has stopped moving or is moving much less than normal.  · You have symptoms of a urinary tract infection. These may include:  ¨ Pain or burning when you urinate. ¨ A frequent need to urinate without being able to pass much urine. ¨ Pain in the flank, which is just below the rib cage and above the waist on either side of the back. ¨ Blood in your urine. Watch closely for changes in your health, and be sure to contact your doctor if:  · You have vaginal discharge that smells bad. · You have skin changes, such as:  ¨ A rash. ¨ Itching. ¨ Yellow color to your skin. · You have other concerns about your pregnancy. If you have labor signs at 37 weeks or more  If you have signs of labor at 37 weeks or more, your doctor may tell you to call when your labor becomes more active. Symptoms of active labor include:  · Contractions that are regular. · Contractions that are less than 5 minutes apart. · Contractions that are hard to talk through. Follow-up care is a key part of your treatment and safety.  Be sure to make and go

## 2018-08-31 ENCOUNTER — HOSPITAL ENCOUNTER (OUTPATIENT)
Age: 28
Discharge: HOME | End: 2018-08-31
Payer: MEDICAID

## 2018-09-07 ENCOUNTER — HOSPITAL ENCOUNTER (OUTPATIENT)
Age: 28
Discharge: HOME | End: 2018-09-09
Payer: MEDICAID

## 2018-09-07 DIAGNOSIS — F12.10 CANNABIS ABUSE: ICD-10-CM

## 2018-09-07 DIAGNOSIS — O40.3XX0 POLYHYDRAMNIOS, THIRD TRIMESTER, NOT APPLICABLE OR UNSPECIFIED: ICD-10-CM

## 2018-09-07 DIAGNOSIS — Z3A.37 37 WEEKS GESTATION OF PREGNANCY: ICD-10-CM

## 2018-09-07 LAB
AMPHETAMINE SCREEN, URINE: NOT DETECTED
BARBITURATE SCREEN URINE: NOT DETECTED
BENZODIAZEPINE SCREEN, URINE: NOT DETECTED
CANNABINOID SCREEN URINE: NOT DETECTED
COCAINE METABOLITE SCREEN URINE: NOT DETECTED
METHADONE SCREEN, URINE: NOT DETECTED
OPIATE SCREEN URINE: NOT DETECTED
PHENCYCLIDINE SCREEN URINE: NOT DETECTED
PROPOXYPHENE SCREEN: NOT DETECTED

## 2018-09-07 PROCEDURE — 87081 CULTURE SCREEN ONLY: CPT

## 2018-09-07 PROCEDURE — 80307 DRUG TEST PRSMV CHEM ANLYZR: CPT

## 2018-09-10 LAB — GROUP B STREP CULTURE: NORMAL

## 2018-09-20 ENCOUNTER — ROUTINE PRENATAL (OUTPATIENT)
Dept: OBGYN CLINIC | Age: 28
DRG: 560 | End: 2018-09-20
Payer: MEDICAID

## 2018-09-20 ENCOUNTER — ANESTHESIA EVENT (OUTPATIENT)
Dept: LABOR AND DELIVERY | Age: 28
DRG: 560 | End: 2018-09-20
Payer: MEDICAID

## 2018-09-20 ENCOUNTER — HOSPITAL ENCOUNTER (INPATIENT)
Age: 28
LOS: 2 days | Discharge: HOME | DRG: 560 | End: 2018-09-22
Attending: OBSTETRICS & GYNECOLOGY | Admitting: OBSTETRICS & GYNECOLOGY
Payer: MEDICAID

## 2018-09-20 ENCOUNTER — ANESTHESIA (OUTPATIENT)
Dept: LABOR AND DELIVERY | Age: 28
DRG: 560 | End: 2018-09-20
Payer: MEDICAID

## 2018-09-20 VITALS
HEART RATE: 115 BPM | DIASTOLIC BLOOD PRESSURE: 82 MMHG | BODY MASS INDEX: 35.67 KG/M2 | SYSTOLIC BLOOD PRESSURE: 127 MMHG | WEIGHT: 195 LBS

## 2018-09-20 DIAGNOSIS — O36.63X0 EXCESSIVE FETAL GROWTH AFFECTING MANAGEMENT OF MOTHER IN SINGLETON PREGNANCY IN THIRD TRIMESTER, ANTEPARTUM: ICD-10-CM

## 2018-09-20 DIAGNOSIS — Z3A.39 39 WEEKS GESTATION OF PREGNANCY: ICD-10-CM

## 2018-09-20 DIAGNOSIS — O40.3XX0 POLYHYDRAMNIOS, THIRD TRIMESTER, NOT APPLICABLE OR UNSPECIFIED: Primary | ICD-10-CM

## 2018-09-20 DIAGNOSIS — O99.333 TOBACCO SMOKING COMPLICATING PREGNANCY, THIRD TRIMESTER: ICD-10-CM

## 2018-09-20 DIAGNOSIS — Z14.1 CYSTIC FIBROSIS CARRIER IN THIRD TRIMESTER, ANTEPARTUM: ICD-10-CM

## 2018-09-20 DIAGNOSIS — O99.213 OBESITY AFFECTING PREGNANCY IN THIRD TRIMESTER: ICD-10-CM

## 2018-09-20 DIAGNOSIS — F12.10 CANNABIS ABUSE: ICD-10-CM

## 2018-09-20 DIAGNOSIS — O09.893 CYSTIC FIBROSIS CARRIER IN THIRD TRIMESTER, ANTEPARTUM: ICD-10-CM

## 2018-09-20 LAB
ABO/RH: NORMAL
AMPHETAMINE SCREEN, URINE: NOT DETECTED
ANTIBODY SCREEN: NORMAL
BARBITURATE SCREEN URINE: NOT DETECTED
BENZODIAZEPINE SCREEN, URINE: NOT DETECTED
CANNABINOID SCREEN URINE: NOT DETECTED
COCAINE METABOLITE SCREEN URINE: NOT DETECTED
GLUCOSE URINE, POC: NEGATIVE
HCT VFR BLD CALC: 33.2 % (ref 34–48)
HEMOGLOBIN: 11.3 G/DL (ref 11.5–15.5)
MCH RBC QN AUTO: 29 PG (ref 26–35)
MCHC RBC AUTO-ENTMCNC: 34 % (ref 32–34.5)
MCV RBC AUTO: 85.3 FL (ref 80–99.9)
METHADONE SCREEN, URINE: NOT DETECTED
OPIATE SCREEN URINE: NOT DETECTED
PDW BLD-RTO: 13.6 FL (ref 11.5–15)
PHENCYCLIDINE SCREEN URINE: NOT DETECTED
PLATELET # BLD: 344 E9/L (ref 130–450)
PMV BLD AUTO: 11.2 FL (ref 7–12)
PROPOXYPHENE SCREEN: NOT DETECTED
PROTEIN UA: POSITIVE
RBC # BLD: 3.89 E12/L (ref 3.5–5.5)
WBC # BLD: 13.7 E9/L (ref 4.5–11.5)

## 2018-09-20 PROCEDURE — 4A1HX4Z MONITORING OF PRODUCTS OF CONCEPTION, CARDIAC ELECTRICAL ACTIVITY, EXTERNAL APPROACH: ICD-10-PCS | Performed by: OBSTETRICS & GYNECOLOGY

## 2018-09-20 PROCEDURE — 76819 FETAL BIOPHYS PROFIL W/O NST: CPT | Performed by: OBSTETRICS & GYNECOLOGY

## 2018-09-20 PROCEDURE — 85027 COMPLETE CBC AUTOMATED: CPT

## 2018-09-20 PROCEDURE — G8427 DOCREV CUR MEDS BY ELIG CLIN: HCPCS | Performed by: OBSTETRICS & GYNECOLOGY

## 2018-09-20 PROCEDURE — G8417 CALC BMI ABV UP PARAM F/U: HCPCS | Performed by: OBSTETRICS & GYNECOLOGY

## 2018-09-20 PROCEDURE — 2580000003 HC RX 258: Performed by: OBSTETRICS & GYNECOLOGY

## 2018-09-20 PROCEDURE — 6370000000 HC RX 637 (ALT 250 FOR IP)

## 2018-09-20 PROCEDURE — 76820 UMBILICAL ARTERY ECHO: CPT | Performed by: OBSTETRICS & GYNECOLOGY

## 2018-09-20 PROCEDURE — 76816 OB US FOLLOW-UP PER FETUS: CPT | Performed by: OBSTETRICS & GYNECOLOGY

## 2018-09-20 PROCEDURE — 36415 COLL VENOUS BLD VENIPUNCTURE: CPT

## 2018-09-20 PROCEDURE — 4004F PT TOBACCO SCREEN RCVD TLK: CPT | Performed by: OBSTETRICS & GYNECOLOGY

## 2018-09-20 PROCEDURE — 86900 BLOOD TYPING SEROLOGIC ABO: CPT

## 2018-09-20 PROCEDURE — 1220000001 HC SEMI PRIVATE L&D R&B

## 2018-09-20 PROCEDURE — 3700000025 ANESTHESIA EPIDURAL BLOCK: Performed by: ANESTHESIOLOGY

## 2018-09-20 PROCEDURE — 6360000002 HC RX W HCPCS: Performed by: OBSTETRICS & GYNECOLOGY

## 2018-09-20 PROCEDURE — 51701 INSERT BLADDER CATHETER: CPT

## 2018-09-20 PROCEDURE — 2500000003 HC RX 250 WO HCPCS: Performed by: ANESTHESIOLOGY

## 2018-09-20 PROCEDURE — 81002 URINALYSIS NONAUTO W/O SCOPE: CPT | Performed by: OBSTETRICS & GYNECOLOGY

## 2018-09-20 PROCEDURE — 80307 DRUG TEST PRSMV CHEM ANLYZR: CPT

## 2018-09-20 PROCEDURE — 99211 OFF/OP EST MAY X REQ PHY/QHP: CPT | Performed by: OBSTETRICS & GYNECOLOGY

## 2018-09-20 PROCEDURE — 86850 RBC ANTIBODY SCREEN: CPT

## 2018-09-20 PROCEDURE — 86901 BLOOD TYPING SEROLOGIC RH(D): CPT

## 2018-09-20 PROCEDURE — 99213 OFFICE O/P EST LOW 20 MIN: CPT | Performed by: OBSTETRICS & GYNECOLOGY

## 2018-09-20 RX ORDER — ONDANSETRON 2 MG/ML
4 INJECTION INTRAMUSCULAR; INTRAVENOUS EVERY 6 HOURS PRN
Status: DISCONTINUED | OUTPATIENT
Start: 2018-09-20 | End: 2018-09-21 | Stop reason: HOSPADM

## 2018-09-20 RX ORDER — NALBUPHINE HCL 10 MG/ML
5 AMPUL (ML) INJECTION EVERY 4 HOURS PRN
Status: DISCONTINUED | OUTPATIENT
Start: 2018-09-20 | End: 2018-09-21 | Stop reason: HOSPADM

## 2018-09-20 RX ORDER — NALOXONE HYDROCHLORIDE 0.4 MG/ML
0.4 INJECTION, SOLUTION INTRAMUSCULAR; INTRAVENOUS; SUBCUTANEOUS PRN
Status: DISCONTINUED | OUTPATIENT
Start: 2018-09-20 | End: 2018-09-21 | Stop reason: HOSPADM

## 2018-09-20 RX ORDER — BUTORPHANOL TARTRATE 1 MG/ML
1 INJECTION, SOLUTION INTRAMUSCULAR; INTRAVENOUS
Status: DISCONTINUED | OUTPATIENT
Start: 2018-09-20 | End: 2018-09-22 | Stop reason: HOSPADM

## 2018-09-20 RX ORDER — SODIUM CHLORIDE, SODIUM LACTATE, POTASSIUM CHLORIDE, CALCIUM CHLORIDE 600; 310; 30; 20 MG/100ML; MG/100ML; MG/100ML; MG/100ML
INJECTION, SOLUTION INTRAVENOUS CONTINUOUS
Status: DISCONTINUED | OUTPATIENT
Start: 2018-09-20 | End: 2018-09-22 | Stop reason: HOSPADM

## 2018-09-20 RX ORDER — ACETAMINOPHEN 650 MG
TABLET, EXTENDED RELEASE ORAL
Status: COMPLETED
Start: 2018-09-20 | End: 2018-09-21

## 2018-09-20 RX ORDER — LIDOCAINE HYDROCHLORIDE 10 MG/ML
INJECTION, SOLUTION INFILTRATION; PERINEURAL
Status: DISCONTINUED
Start: 2018-09-20 | End: 2018-09-21 | Stop reason: WASHOUT

## 2018-09-20 RX ADMIN — Medication 5 ML: at 20:55

## 2018-09-20 RX ADMIN — DINOPROSTONE 10 MG: 10 INSERT VAGINAL at 12:17

## 2018-09-20 RX ADMIN — BUTORPHANOL TARTRATE 1 MG: 1 INJECTION, SOLUTION INTRAMUSCULAR; INTRAVENOUS at 16:36

## 2018-09-20 RX ADMIN — Medication 15 ML/HR: at 20:55

## 2018-09-20 RX ADMIN — SODIUM CHLORIDE, POTASSIUM CHLORIDE, SODIUM LACTATE AND CALCIUM CHLORIDE: 600; 310; 30; 20 INJECTION, SOLUTION INTRAVENOUS at 17:45

## 2018-09-20 RX ADMIN — Medication 5 ML: at 20:51

## 2018-09-20 RX ADMIN — BUTORPHANOL TARTRATE 1 MG: 1 INJECTION, SOLUTION INTRAMUSCULAR; INTRAVENOUS at 13:55

## 2018-09-20 RX ADMIN — SODIUM CHLORIDE, POTASSIUM CHLORIDE, SODIUM LACTATE AND CALCIUM CHLORIDE: 600; 310; 30; 20 INJECTION, SOLUTION INTRAVENOUS at 16:00

## 2018-09-20 RX ADMIN — SODIUM CHLORIDE, POTASSIUM CHLORIDE, SODIUM LACTATE AND CALCIUM CHLORIDE: 600; 310; 30; 20 INJECTION, SOLUTION INTRAVENOUS at 12:15

## 2018-09-20 ASSESSMENT — LIFESTYLE VARIABLES: SMOKING_STATUS: 1

## 2018-09-20 ASSESSMENT — PAIN SCALES - GENERAL
PAINLEVEL_OUTOF10: 7
PAINLEVEL_OUTOF10: 8

## 2018-09-20 NOTE — PATIENT INSTRUCTIONS
that are hard to talk through. Follow-up care is a key part of your treatment and safety. Be sure to make and go to all appointments, and call your doctor if you are having problems. It's also a good idea to know your test results and keep a list of the medicines you take. Where can you learn more? Go to https://chpejanineewnaida.TEOCO Corporation. org and sign in to your Arnica account. Enter  in the PropelAd.com box to learn more about \"Learning About When to Call Your Doctor During Pregnancy (After 20 Weeks). \"     If you do not have an account, please click on the \"Sign Up Now\" link. Current as of: November 21, 2017  Content Version: 11.7  © 4691-2932 FÃƒÂ©vrier [employer], [employer] ([employer]). Care instructions adapted under license by [facility] ([facility]). If you have questions about a medical condition or this instruction, always ask your healthcare professional. [place] [address] any warranty or liability for your use of this information. Patient Education        Counting Your Baby's Kicks: Care Instructions  Your Care Instructions    Counting your baby's kicks is one way your doctor can tell that your baby is healthy. Most women-especially in a first pregnancy-feel their baby move for the first time between 16 and 22 weeks. The movement may feel like flutters rather than kicks. Your baby may move more at certain times of the day. When you are active, you may notice less kicking than when you are resting. At your prenatal visits, your doctor will ask whether the baby is active. In your last trimester, your doctor may ask you to count the number of times you feel your baby move. Follow-up care is a key part of your treatment and safety. Be sure to make and go to all appointments, and call your doctor if you are having problems. It's also a good idea to know your test results and keep a list of the medicines you take. How do you count fetal kicks?   · A common method of checking your baby's movement is to count the number of kicks or moves you feel in 1 hour. Ten movements (such as kicks, flutters, or rolls) in 1 hour are normal. Some doctors suggest that you count in the morning until you get to 10 movements. Then you can quit for that day and start again the next day. · Pick your baby's most active time of day to count. This may be any time from morning to evening. · If you do not feel 10 movements in an hour, your baby may be sleeping. Wait for the next hour and count again. When should you call for help? Call your doctor now or seek immediate medical care if:    · You noticed that your baby has stopped moving or is moving much less than normal.    Watch closely for changes in your health, and be sure to contact your doctor if you have any problems. Where can you learn more? Go to https://Preferred Commercepepiceweb.Panvidea. org and sign in to your Prodagio Software account. Enter H864 in the USINE IO box to learn more about \"Counting Your Baby's Kicks: Care Instructions. \"     If you do not have an account, please click on the \"Sign Up Now\" link. Current as of: November 21, 2017  Content Version: 11.7  © 5162-1710 [employer]May[employer]ou[employer]. Care instructions adapted under license by [facility] ([facility]). If you have questions about a medical condition or this instruction, always ask your healthcare professional. [place] [address] any warranty or liability for your use of this information.

## 2018-09-20 NOTE — PROGRESS NOTES
Biophysical Profile WO Non Stress Testing [id number] Custom]  US Doppler Fetal Umbilical Artery [KJR9005 Custom]      **This report has been created using voice recognition software.  It may contain minor errors     which are inherent in voice recognition technology**

## 2018-09-20 NOTE — PROGRESS NOTES
Pt presents to l&d from Dr. Pineda [facility] office for IOL for polyhydramnios. States she sees Dr. Chava Flores because she is a cystic fibrosis carrier. She is a  with history of miscarriages. EDC is 10/26/18. She denies any contractions, leaking of fluid or vaginal bleeding. Perceives good fetal movement. EFM applied. VSS.

## 2018-09-20 NOTE — H&P
changes  Psych: no depressed mood, no suicidal ideation    Social History:     reports that she has been smoking. She has been smoking about 0.25 packs per day. She has never used smokeless tobacco. She reports that she uses drugs, including Marijuana. She reports that she does not drink alcohol. Medications Prior to Admission:  Prescriptions Prior to Admission: ranitidine (ZANTAC) 150 MG tablet, Take 150 mg by mouth 2 times daily  Prenatal Vit-Fe Fumarate-FA (PRENATAL VITAMIN PO), Take by mouth    Allergies:  Penicillins; Tape [adhesive tape]; and Azithromycin    PHYSICAL EXAM:  /79   Pulse 80   Temp 97.8 °F (36.6 °C) (Oral)   Resp 16   General appearance: Comfortable  Lungs:  CTA   Heart:  Regular Rhythm  Abdomen:  Soft, non-tender, gravid  Fetal heart rate:  Baseline Heart Rate 130, accelerations: present    Cervix:    DILATION: 1 cm  EFFACEMENT:   Long  STATION:  -3 cm  CONSISTENCY:  medium  POSITION:  posterior  Presentation:Cephalic,  Contraction frequency:  Few seen  Membranes:  Intact      ASSESSMENT     IUP at 39 weeks. Polyhydramnios  Cystic fibrosis mutation carrier        Plan: mfm sent for induction.  Nurse to notify dr Nyasia Fagan for orders          Electronically signed by Korina Bowman MD on 9/20/2018 at 11:17 AM

## 2018-09-20 NOTE — ANESTHESIA PRE PROCEDURE
Department of Anesthesiology  Preprocedure Note       Name:  Gio Guillermo   Age:  34 y.o.  :  1990                                          MRN:  [id number]         Date:  2018      Surgeon: * No surgeons listed *    Procedure: epidural vs spinal vs general    Medications prior to admission:   Prior to Admission medications    Medication Sig Start Date End Date Taking? Authorizing Provider   ranitidine (ZANTAC) 150 MG tablet Take 150 mg by mouth 2 times daily   Yes Historical Provider, MD   Prenatal Vit-Fe Fumarate-FA (PRENATAL VITAMIN PO) Take by mouth   Yes Historical Provider, MD       Current medications:    Current Facility-Administered Medications   Medication Dose Route Frequency Provider Last Rate Last Dose    lactated ringers infusion   Intravenous Continuous Wily Crockett  mL/hr at 18 1215      ondansetron (ZOFRAN) injection 4 mg  4 mg Intravenous Q6H PRN Wily Crockett MD        butorphanol (STADOL) injection 1 mg  1 mg Intravenous Q2H PRN Wily Crockett MD        Or    butorphanol (STADOL) injection 2 mg  2 mg Intravenous Q2H PRN Wily Crockett MD           Allergies:     Allergies   Allergen Reactions    Penicillins Anaphylaxis    Tape Su Tin Tape] Hives    Azithromycin Hives       Problem List:    Patient Active Problem List   Diagnosis Code    Tobacco smoking affecting pregnancy in second trimester O99.332    Drug dependence affecting pregnancy in second trimester O99.322    Cannabis dependence ([facility] [address] F12.20    Cystic fibrosis carrier in second trimester, antepartum O09.892, Z14.1    29 weeks gestation of pregnancy Z3A.29    Pregnancy with 29 completed weeks gestation Z3A.29    Abdominal pain affecting pregnancy O26.899, R10.9    Vaginal spotting N92.0    Obesity affecting pregnancy in third trimester O99.213    Cystic fibrosis carrier in third trimester, antepartum O09.893, Z14.1    Suspected shortening of cervix not found Z03.75   

## 2018-09-21 PROCEDURE — 6360000002 HC RX W HCPCS

## 2018-09-21 PROCEDURE — 1220000000 HC SEMI PRIVATE OB R&B

## 2018-09-21 PROCEDURE — 6370000000 HC RX 637 (ALT 250 FOR IP): Performed by: OBSTETRICS & GYNECOLOGY

## 2018-09-21 PROCEDURE — 51701 INSERT BLADDER CATHETER: CPT

## 2018-09-21 PROCEDURE — 2580000003 HC RX 258: Performed by: OBSTETRICS & GYNECOLOGY

## 2018-09-21 PROCEDURE — 7200000001 HC VAGINAL DELIVERY

## 2018-09-21 PROCEDURE — 6360000002 HC RX W HCPCS: Performed by: ANESTHESIOLOGY

## 2018-09-21 RX ORDER — BISACODYL 10 MG
10 SUPPOSITORY, RECTAL RECTAL DAILY PRN
Status: DISCONTINUED | OUTPATIENT
Start: 2018-09-21 | End: 2018-09-22 | Stop reason: HOSPADM

## 2018-09-21 RX ORDER — DOCUSATE SODIUM 100 MG/1
100 CAPSULE, LIQUID FILLED ORAL 2 TIMES DAILY
Status: DISCONTINUED | OUTPATIENT
Start: 2018-09-21 | End: 2018-09-22 | Stop reason: HOSPADM

## 2018-09-21 RX ORDER — HYDROCODONE BITARTRATE AND ACETAMINOPHEN 5; 325 MG/1; MG/1
2 TABLET ORAL EVERY 4 HOURS PRN
Status: DISCONTINUED | OUTPATIENT
Start: 2018-09-21 | End: 2018-09-22 | Stop reason: HOSPADM

## 2018-09-21 RX ORDER — BUPIVACAINE HYDROCHLORIDE 2.5 MG/ML
INJECTION, SOLUTION EPIDURAL; INFILTRATION; INTRACAUDAL
Status: DISPENSED
Start: 2018-09-21 | End: 2018-09-21

## 2018-09-21 RX ORDER — IBUPROFEN 800 MG/1
800 TABLET ORAL EVERY 8 HOURS PRN
Status: DISCONTINUED | OUTPATIENT
Start: 2018-09-21 | End: 2018-09-22 | Stop reason: HOSPADM

## 2018-09-21 RX ORDER — FERROUS SULFATE 325(65) MG
325 TABLET ORAL
Status: DISCONTINUED | OUTPATIENT
Start: 2018-09-21 | End: 2018-09-22 | Stop reason: HOSPADM

## 2018-09-21 RX ORDER — LANOLIN 100 %
OINTMENT (GRAM) TOPICAL PRN
Status: DISCONTINUED | OUTPATIENT
Start: 2018-09-21 | End: 2018-09-22 | Stop reason: HOSPADM

## 2018-09-21 RX ORDER — HYDROCODONE BITARTRATE AND ACETAMINOPHEN 5; 325 MG/1; MG/1
1 TABLET ORAL EVERY 4 HOURS PRN
Status: DISCONTINUED | OUTPATIENT
Start: 2018-09-21 | End: 2018-09-22 | Stop reason: HOSPADM

## 2018-09-21 RX ORDER — SODIUM CHLORIDE 0.9 % (FLUSH) 0.9 %
10 SYRINGE (ML) INJECTION EVERY 12 HOURS SCHEDULED
Status: DISCONTINUED | OUTPATIENT
Start: 2018-09-21 | End: 2018-09-22 | Stop reason: HOSPADM

## 2018-09-21 RX ORDER — SIMETHICONE 80 MG
80 TABLET,CHEWABLE ORAL EVERY 6 HOURS PRN
Status: DISCONTINUED | OUTPATIENT
Start: 2018-09-21 | End: 2018-09-22 | Stop reason: HOSPADM

## 2018-09-21 RX ORDER — SODIUM CHLORIDE 0.9 % (FLUSH) 0.9 %
10 SYRINGE (ML) INJECTION PRN
Status: DISCONTINUED | OUTPATIENT
Start: 2018-09-21 | End: 2018-09-22 | Stop reason: HOSPADM

## 2018-09-21 RX ORDER — PANTOPRAZOLE SODIUM 40 MG/1
40 TABLET, DELAYED RELEASE ORAL DAILY PRN
Status: DISCONTINUED | OUTPATIENT
Start: 2018-09-21 | End: 2018-09-22 | Stop reason: HOSPADM

## 2018-09-21 RX ORDER — ACETAMINOPHEN 325 MG/1
650 TABLET ORAL EVERY 4 HOURS PRN
Status: DISCONTINUED | OUTPATIENT
Start: 2018-09-21 | End: 2018-09-22 | Stop reason: HOSPADM

## 2018-09-21 RX ORDER — ONDANSETRON 4 MG/1
4 TABLET, FILM COATED ORAL EVERY 6 HOURS PRN
Status: DISCONTINUED | OUTPATIENT
Start: 2018-09-21 | End: 2018-09-22 | Stop reason: HOSPADM

## 2018-09-21 RX ORDER — SODIUM CHLORIDE, SODIUM LACTATE, POTASSIUM CHLORIDE, CALCIUM CHLORIDE 600; 310; 30; 20 MG/100ML; MG/100ML; MG/100ML; MG/100ML
INJECTION, SOLUTION INTRAVENOUS CONTINUOUS
Status: DISCONTINUED | OUTPATIENT
Start: 2018-09-21 | End: 2018-09-22 | Stop reason: HOSPADM

## 2018-09-21 RX ADMIN — DOCUSATE SODIUM 100 MG: 100 CAPSULE, LIQUID FILLED ORAL at 10:01

## 2018-09-21 RX ADMIN — Medication: at 03:30

## 2018-09-21 RX ADMIN — IBUPROFEN 800 MG: 800 TABLET ORAL at 15:47

## 2018-09-21 RX ADMIN — SODIUM CHLORIDE, POTASSIUM CHLORIDE, SODIUM LACTATE AND CALCIUM CHLORIDE: 600; 310; 30; 20 INJECTION, SOLUTION INTRAVENOUS at 00:50

## 2018-09-21 RX ADMIN — Medication 999 MILLI-UNITS/MIN: at 03:44

## 2018-09-21 RX ADMIN — DOCUSATE SODIUM 100 MG: 100 CAPSULE, LIQUID FILLED ORAL at 21:20

## 2018-09-21 RX ADMIN — ONDANSETRON 4 MG: 2 INJECTION INTRAMUSCULAR; INTRAVENOUS at 01:07

## 2018-09-21 ASSESSMENT — PAIN SCALES - GENERAL: PAINLEVEL_OUTOF10: 3

## 2018-09-21 ASSESSMENT — PAIN DESCRIPTION - RADICULAR PAIN: RADICULAR_PAIN: ABSENT

## 2018-09-21 NOTE — CARE COORDINATION
Social Work/Discharge Planning:   Met with 35 yo [address], mother of  baby girl (Gypsy Maxwell Meigs) due to a history of marijuana use. [place] with baby's father 33 yo Ghada Martin., her 2 children 10 yo Meghann Aguilar 11, 3 yo Farshad Gilliland 13, and the owner of the house [employer]. She states that they are helping to pay the mortgage on the house. Claire works at [employer] and has [employer]. She plans to apply for [facility]. This is the first baby for FOB. He is not employed. Prenatal care began with Dr. Naeem Costa. She was then transferred to Dr. Lilia Huff and Dr. Conor Agarwal. Discussed Crystal's + UDS on 3/05. She admitted to using marijuana 1-2 times/week for nausea until 3 months ago. She denies any other drug or alcohol use. Claire also admitted to having liliana diagnosed with Bipolar Disorder a few years ago however states she is not treated for this. She does no counseling and takes no medication. She reports being able to control it on her own. Ms. Nydia Schroeder reports her mother to be her main support. She states she has all needed supplies for baby including a crib and car seat. She denies involvement with any agencies. Provided her with information on [employer] me [employer]. Also made her aware of the need for a Children's Services referral.  She verbalized understanding. Plan:  Marino Crowe referral was made today. [place] [facility] info was given.     Electronically signed by RUTHIE Carolina on 2018 at 12:46 PM

## 2018-09-21 NOTE — FLOWSHEET NOTE
Admitted to unit. Oriented to room and call light. rn cell number explained and written on white board. Infant safety discussed and understanding verbalized. Information sheet for congenital heart disease screening given. Abc's of safe sleep discussed with understanding verbalizes. Understands no fluffy blankets, no hat, nothing else in crib, sleep on back in crib with sleep sack or blanket under arms.

## 2018-09-21 NOTE — ANESTHESIA PROCEDURE NOTES
Epidural Block    Patient location during procedure: OB  Start time: 9/20/2018 8:40 PM  End time: 9/20/2018 8:50 PM  Reason for block: labor epidural  Staffing  Anesthesiologist: Loida Sharma  Resident/CRNA: Oneyda Shepard  Performed: resident/CRNA   Preanesthetic Checklist  Completed: patient identified, site marked, surgical consent, pre-op evaluation, timeout performed, IV checked, risks and benefits discussed, monitors and equipment checked, anesthesia consent given, oxygen available and patient being monitored  Epidural  Patient position: sitting  Prep: ChloraPrep  Patient monitoring: cardiac monitor, continuous pulse ox and frequent blood pressure checks  Approach: midline  Location: lumbar (1-5)  Injection technique: DANA air  Provider prep: mask and sterile gloves  Needle  Needle type: Tuohy   Needle gauge: 18 G  Needle length: 3.5 in  Needle insertion depth: 5 cm  Catheter type: side hole  Catheter size: 20 G.   Catheter at skin depth: 10 cm  Test dose: negative  Assessment  Sensory level: T6  Hemodynamics: stable  Attempts: 1

## 2018-09-22 VITALS
SYSTOLIC BLOOD PRESSURE: 122 MMHG | RESPIRATION RATE: 16 BRPM | WEIGHT: 194 LBS | DIASTOLIC BLOOD PRESSURE: 73 MMHG | OXYGEN SATURATION: 97 % | BODY MASS INDEX: 35.7 KG/M2 | HEART RATE: 71 BPM | HEIGHT: 62 IN | TEMPERATURE: 98.4 F

## 2018-09-22 LAB
HCT VFR BLD CALC: 30 % (ref 34–48)
HEMOGLOBIN: 9.7 G/DL (ref 11.5–15.5)

## 2018-09-22 PROCEDURE — 85014 HEMATOCRIT: CPT

## 2018-09-22 PROCEDURE — 85018 HEMOGLOBIN: CPT

## 2018-09-22 PROCEDURE — 36415 COLL VENOUS BLD VENIPUNCTURE: CPT

## 2018-09-22 PROCEDURE — 6370000000 HC RX 637 (ALT 250 FOR IP): Performed by: OBSTETRICS & GYNECOLOGY

## 2018-09-22 RX ADMIN — DOCUSATE SODIUM 100 MG: 100 CAPSULE, LIQUID FILLED ORAL at 08:16

## 2018-09-22 RX ADMIN — IBUPROFEN 800 MG: 800 TABLET ORAL at 11:41

## 2018-09-22 RX ADMIN — FERROUS SULFATE TAB 325 MG (65 MG ELEMENTAL FE) 325 MG: 325 (65 FE) TAB at 08:16

## 2018-09-22 ASSESSMENT — PAIN SCALES - GENERAL
PAINLEVEL_OUTOF10: 0
PAINLEVEL_OUTOF10: 3

## 2018-09-22 NOTE — LACTATION NOTE
Pt states baby latching & feeding well for her. No concerns at present. Lactation contact numbers given.

## 2018-09-26 NOTE — ANESTHESIA POSTPROCEDURE EVALUATION
Department of Anesthesiology  Postprocedure Note    Patient: Alison Baxter  MRN: [id number]  YOB: 1990  Date of evaluation: 9/26/2018  Time:  1:53 PM     Procedure Summary     Date:  09/20/18 Room / Location:      Anesthesia Start:  2040 Anesthesia Stop:  09/21/18 0341    Procedure:  ANESTHESIA LABOR ANALGESIA Diagnosis:      Scheduled Providers:   Responsible Provider:  Penelope Shirley DO    Anesthesia Type:  general, regional, spinal ASA Status:  3          Anesthesia Type: general, regional, spinal    Ciro Phase I:      Ciro Phase II:      Last vitals: Reviewed and per EMR flowsheets.        Anesthesia Post Evaluation    Patient location during evaluation: PACU  Patient participation: complete - patient participated  Level of consciousness: awake and alert  Airway patency: patent  Nausea & Vomiting: no nausea and no vomiting  Complications: no  Cardiovascular status: hemodynamically stable  Respiratory status: acceptable  Hydration status: euvolemic

## 2019-06-20 ENCOUNTER — HOSPITAL ENCOUNTER (EMERGENCY)
Dept: HOSPITAL 83 - ED | Age: 29
Discharge: HOME | End: 2019-06-20
Payer: COMMERCIAL

## 2019-06-20 VITALS — BODY MASS INDEX: 29.44 KG/M2 | WEIGHT: 160 LBS | HEIGHT: 61.97 IN

## 2019-06-20 DIAGNOSIS — R35.8: ICD-10-CM

## 2019-06-20 DIAGNOSIS — Z88.0: ICD-10-CM

## 2019-06-20 DIAGNOSIS — M54.5: ICD-10-CM

## 2019-06-20 DIAGNOSIS — Z88.1: ICD-10-CM

## 2019-06-20 DIAGNOSIS — F17.200: ICD-10-CM

## 2019-06-20 DIAGNOSIS — R10.9: Primary | ICD-10-CM

## 2019-06-20 LAB
ALBUMIN SERPL-MCNC: 3.5 GM/DL (ref 3.1–4.5)
ALP SERPL-CCNC: 112 U/L (ref 45–117)
ALT SERPL W P-5'-P-CCNC: 31 U/L (ref 12–78)
AMPHETAMINES UR QL SCN: < 1000
APPEARANCE UR: (no result)
AST SERPL-CCNC: 23 IU/L (ref 3–35)
BACTERIA #/AREA URNS HPF: (no result) /[HPF]
BARBITURATES UR QL SCN: < 200
BASOPHILS # BLD AUTO: 1 % (ref 0–1)
BENZODIAZ UR QL SCN: < 200
BILIRUB UR QL STRIP: NEGATIVE
BUN SERPL-MCNC: 10 MG/DL (ref 7–24)
BZE UR QL SCN: < 300
CANNABINOIDS UR QL SCN: > 50
CHLORIDE SERPL-SCNC: 108 MMOL/L (ref 98–107)
COLOR UR: YELLOW
CREAT SERPL-MCNC: 0.81 MG/DL (ref 0.55–1.02)
EPI CELLS #/AREA URNS HPF: (no result) /[HPF]
ERYTHROCYTE [DISTWIDTH] IN BLOOD BY AUTOMATED COUNT: 14.5 % (ref 0–14.5)
GLUCOSE UR QL: NEGATIVE
HCT VFR BLD AUTO: 36.2 % (ref 37–47)
HGB BLD-MCNC: 12 G/DL (ref 12–16)
HGB UR QL STRIP: (no result)
KETONES UR QL STRIP: NEGATIVE
LEUKOCYTE ESTERASE UR QL STRIP: NEGATIVE
MCH RBC QN AUTO: 28 PG (ref 27–31)
MCHC RBC AUTO-ENTMCNC: 33.1 G/DL (ref 33–37)
MCV RBC AUTO: 84.4 FL (ref 81–99)
METHADONE UR QL SCN: < 300
NITRITE UR QL STRIP: NEGATIVE
NRBC BLD QL AUTO: 0 10*3/UL (ref 0–0)
OPIATES UR QL SCN: < 300
PCP UR QL SCN: <  25
PH UR STRIP: 6 [PH] (ref 5–9)
PLATELET # BLD AUTO: 313 10*3/UL (ref 130–400)
PLATELET SUFFICIENCY: NORMAL
PMV BLD AUTO: 9.5 FL (ref 9.6–12.3)
POTASSIUM SERPL-SCNC: 3.7 MMOL/L (ref 3.5–5.1)
PROT SERPL-MCNC: 7.5 GM/DL (ref 6.4–8.2)
RBC # BLD AUTO: 4.29 10*6/UL (ref 4.1–5.1)
RBC #/AREA URNS HPF: (no result) RBC/HPF (ref 0–2)
RBC MORPH BLD: NORMAL
SODIUM SERPL-SCNC: 139 MMOL/L (ref 136–145)
SP GR UR: >= 1.03 (ref 1–1.03)
TOTAL CELLS COUNTED: 100 #CELLS
UROBILINOGEN UR STRIP-MCNC: 1 E.U./DL (ref 0.2–1)
VARIANT LYMPHS NFR BLD MANUAL: 2 % (ref 0–0)
WBC #/AREA URNS HPF: (no result) WBC/HPF (ref 0–5)
WBC NRBC COR # BLD AUTO: 9.7 10*3/UL (ref 4.8–10.8)

## 2020-11-25 ENCOUNTER — HOSPITAL ENCOUNTER (OUTPATIENT)
Dept: HOSPITAL 83 - COVID19 | Age: 30
Discharge: HOME | End: 2020-11-25
Attending: INTERNAL MEDICINE
Payer: COMMERCIAL

## 2020-11-25 DIAGNOSIS — Z20.828: Primary | ICD-10-CM
